# Patient Record
Sex: MALE | Race: WHITE
[De-identification: names, ages, dates, MRNs, and addresses within clinical notes are randomized per-mention and may not be internally consistent; named-entity substitution may affect disease eponyms.]

---

## 2020-07-10 ENCOUNTER — HOSPITAL ENCOUNTER (EMERGENCY)
Dept: HOSPITAL 39 - ER | Age: 70
Discharge: ANOTHER HOSPITAL | End: 2020-07-10
Payer: COMMERCIAL

## 2020-07-10 VITALS — DIASTOLIC BLOOD PRESSURE: 99 MMHG | SYSTOLIC BLOOD PRESSURE: 189 MMHG | OXYGEN SATURATION: 95 % | TEMPERATURE: 98.7 F
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
## 2020-07-10 DIAGNOSIS — S29.9XXA: ICD-10-CM

## 2020-07-10 DIAGNOSIS — J44.9: ICD-10-CM

## 2020-07-10 DIAGNOSIS — S12.201A: Primary | ICD-10-CM

## 2020-07-10 DIAGNOSIS — S22.20XA: ICD-10-CM
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
## 2020-07-10 DIAGNOSIS — R07.89: ICD-10-CM

## 2020-07-10 DIAGNOSIS — E11.9: ICD-10-CM

## 2020-07-10 DIAGNOSIS — S00.03XA: ICD-10-CM

## 2020-07-10 DIAGNOSIS — S01.01XA: ICD-10-CM

## 2020-07-10 DIAGNOSIS — S22.001A: ICD-10-CM

## 2020-07-10 PROCEDURE — 36415 COLL VENOUS BLD VENIPUNCTURE: CPT

## 2020-07-10 PROCEDURE — 82550 ASSAY OF CK (CPK): CPT

## 2020-07-10 PROCEDURE — 82553 CREATINE MB FRACTION: CPT

## 2020-07-10 PROCEDURE — 84484 ASSAY OF TROPONIN QUANT: CPT

## 2020-07-10 PROCEDURE — 85025 COMPLETE CBC W/AUTO DIFF WBC: CPT

## 2020-07-10 PROCEDURE — 82150 ASSAY OF AMYLASE: CPT

## 2020-07-10 PROCEDURE — 85730 THROMBOPLASTIN TIME PARTIAL: CPT

## 2020-07-10 PROCEDURE — 71250 CT THORAX DX C-: CPT

## 2020-07-10 PROCEDURE — 80320 DRUG SCREEN QUANTALCOHOLS: CPT

## 2020-07-10 PROCEDURE — 83880 ASSAY OF NATRIURETIC PEPTIDE: CPT

## 2020-07-10 PROCEDURE — 74176 CT ABD & PELVIS W/O CONTRAST: CPT

## 2020-07-10 PROCEDURE — 85610 PROTHROMBIN TIME: CPT

## 2020-07-10 PROCEDURE — 71275 CT ANGIOGRAPHY CHEST: CPT

## 2020-07-10 PROCEDURE — 80053 COMPREHEN METABOLIC PANEL: CPT

## 2020-07-10 PROCEDURE — 72125 CT NECK SPINE W/O DYE: CPT

## 2020-07-10 PROCEDURE — 70450 CT HEAD/BRAIN W/O DYE: CPT

## 2020-07-10 PROCEDURE — 74177 CT ABD & PELVIS W/CONTRAST: CPT
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
